# Patient Record
Sex: FEMALE | Race: BLACK OR AFRICAN AMERICAN | URBAN - METROPOLITAN AREA
[De-identification: names, ages, dates, MRNs, and addresses within clinical notes are randomized per-mention and may not be internally consistent; named-entity substitution may affect disease eponyms.]

---

## 2017-01-18 ENCOUNTER — APPOINTMENT (OUTPATIENT)
Dept: URBAN - METROPOLITAN AREA CLINIC 292 | Age: 22
Setting detail: DERMATOLOGY
End: 2017-01-18

## 2017-01-18 DIAGNOSIS — L90.5 SCAR CONDITIONS AND FIBROSIS OF SKIN: ICD-10-CM

## 2017-01-18 DIAGNOSIS — L81.0 POSTINFLAMMATORY HYPERPIGMENTATION: ICD-10-CM

## 2017-01-18 DIAGNOSIS — L70.0 ACNE VULGARIS: ICD-10-CM

## 2017-01-18 PROCEDURE — OTHER COUNSELING: OTHER

## 2017-01-18 PROCEDURE — OTHER CHEMICAL PEEL GLYCOLIC ACID: OTHER

## 2017-01-18 PROCEDURE — OTHER ACNE SURGERY: OTHER

## 2017-01-18 PROCEDURE — 99213 OFFICE O/P EST LOW 20 MIN: CPT | Mod: 25

## 2017-01-18 PROCEDURE — 10040 ACNE SURGERY: CPT

## 2017-01-18 ASSESSMENT — LOCATION ZONE DERM: LOCATION ZONE: FACE

## 2017-01-18 ASSESSMENT — LOCATION SIMPLE DESCRIPTION DERM: LOCATION SIMPLE: LEFT CHEEK

## 2017-01-18 ASSESSMENT — LOCATION DETAILED DESCRIPTION DERM: LOCATION DETAILED: LEFT LATERAL MALAR CHEEK

## 2017-01-18 NOTE — PROCEDURE: CHEMICAL PEEL GLYCOLIC ACID
Treatment Number: 0
Time (Mins): 3
Post Peel Care: The peel was neutralized with sodium bicarbonate.  After the procedure, the treatment area was washed with soap and water, and a post-peel cream was applied. Sun protection and post-care instructions were reviewed with the patient.
Glycolic Acid %: 30%
Price (Use Numbers Only, No Special Characters Or $): 00
Detail Level: Zone
Prep: The treated area was degreased with acetone and vaseline was applied for protection of mucous membranes.

## 2017-01-18 NOTE — PROCEDURE: ACNE SURGERY
Render Post-Care Instructions In Note?: no
Detail Level: Detailed
Post-Care Instructions: I reviewed with the patient in detail post-care instructions. Patient is to keep the treatment areaas dry overnight, and then apply bacitracin twice daily until healed. Patient may apply hydrogen peroxide soaks to remove any crusting.
Consent was obtained and risks were reviewed including but not limited to scarring, infection, bleeding, scabbing, incomplete removal, and allergy to anesthesia.
Prep Text (Optional): Prior to removal the treatment areas were prepped in the usual fashion.
Acne Type: Comedonal Lesions

## 2017-02-21 ENCOUNTER — APPOINTMENT (OUTPATIENT)
Dept: URBAN - METROPOLITAN AREA CLINIC 292 | Age: 22
Setting detail: DERMATOLOGY
End: 2017-02-21

## 2017-02-21 DIAGNOSIS — L70.0 ACNE VULGARIS: ICD-10-CM

## 2017-02-21 DIAGNOSIS — L90.5 SCAR CONDITIONS AND FIBROSIS OF SKIN: ICD-10-CM

## 2017-02-21 DIAGNOSIS — L81.0 POSTINFLAMMATORY HYPERPIGMENTATION: ICD-10-CM

## 2017-02-21 DIAGNOSIS — L30.8 OTHER SPECIFIED DERMATITIS: ICD-10-CM

## 2017-02-21 PROBLEM — L30.9 DERMATITIS, UNSPECIFIED: Status: ACTIVE | Noted: 2017-02-21

## 2017-02-21 PROCEDURE — 10040 ACNE SURGERY: CPT

## 2017-02-21 PROCEDURE — OTHER CHEMICAL PEEL GLYCOLIC ACID: OTHER

## 2017-02-21 PROCEDURE — OTHER COUNSELING: OTHER

## 2017-02-21 PROCEDURE — 99213 OFFICE O/P EST LOW 20 MIN: CPT | Mod: 25

## 2017-02-21 PROCEDURE — OTHER ACNE SURGERY: OTHER

## 2017-02-21 PROCEDURE — OTHER PRESCRIPTION: OTHER

## 2017-02-21 RX ORDER — BETAMETHASONE DIPROPIONATE 0.5 MG/G
OINTMENT TOPICAL
Qty: 1 | Refills: 3 | Status: ERX

## 2017-02-21 ASSESSMENT — LOCATION ZONE DERM: LOCATION ZONE: FACE

## 2017-02-21 ASSESSMENT — LOCATION DETAILED DESCRIPTION DERM: LOCATION DETAILED: LEFT LATERAL MALAR CHEEK

## 2017-02-21 ASSESSMENT — LOCATION SIMPLE DESCRIPTION DERM: LOCATION SIMPLE: LEFT CHEEK

## 2017-02-21 NOTE — PROCEDURE: CHEMICAL PEEL GLYCOLIC ACID
Prep: The treated area was degreased with acetone and vaseline was applied for protection of mucous membranes.
Detail Level: Zone
Time (Mins): 3
Price (Use Numbers Only, No Special Characters Or $): 00
Glycolic Acid %: 30%
Post Peel Care: The peel was neutralized with sodium bicarbonate.  After the procedure, the treatment area was washed with soap and water, and a post-peel cream was applied. Sun protection and post-care instructions were reviewed with the patient.
Treatment Number: 0

## 2017-02-21 NOTE — PROCEDURE: ACNE SURGERY
Acne Type: Comedonal Lesions
Post-Care Instructions: I reviewed with the patient in detail post-care instructions. Patient is to keep the treatment areaas dry overnight, and then apply bacitracin twice daily until healed. Patient may apply hydrogen peroxide soaks to remove any crusting.
Detail Level: Detailed
Render Number Of Lesions Treated: no
Consent was obtained and risks were reviewed including but not limited to scarring, infection, bleeding, scabbing, incomplete removal, and allergy to anesthesia.
Prep Text (Optional): Prior to removal the treatment areas were prepped in the usual fashion.

## 2017-03-21 ENCOUNTER — APPOINTMENT (OUTPATIENT)
Dept: URBAN - METROPOLITAN AREA CLINIC 292 | Age: 22
Setting detail: DERMATOLOGY
End: 2017-03-21

## 2017-03-21 DIAGNOSIS — L81.0 POSTINFLAMMATORY HYPERPIGMENTATION: ICD-10-CM

## 2017-03-21 DIAGNOSIS — L30.8 OTHER SPECIFIED DERMATITIS: ICD-10-CM

## 2017-03-21 DIAGNOSIS — L70.0 ACNE VULGARIS: ICD-10-CM

## 2017-03-21 DIAGNOSIS — L90.5 SCAR CONDITIONS AND FIBROSIS OF SKIN: ICD-10-CM

## 2017-03-21 PROBLEM — L30.9 DERMATITIS, UNSPECIFIED: Status: ACTIVE | Noted: 2017-03-21

## 2017-03-21 PROCEDURE — OTHER COUNSELING: OTHER

## 2017-03-21 PROCEDURE — OTHER CHEMICAL PEEL GLYCOLIC ACID: OTHER

## 2017-03-21 PROCEDURE — 99213 OFFICE O/P EST LOW 20 MIN: CPT | Mod: 25

## 2017-03-21 PROCEDURE — OTHER ACNE SURGERY: OTHER

## 2017-03-21 PROCEDURE — 10040 ACNE SURGERY: CPT

## 2017-03-21 PROCEDURE — OTHER PRESCRIPTION: OTHER

## 2017-03-21 RX ORDER — BETAMETHASONE DIPROPIONATE 0.5 MG/G
OINTMENT TOPICAL
Qty: 1 | Refills: 3 | Status: ERX

## 2017-03-21 RX ORDER — TRETINOIN 1 MG/G
CREAM TOPICAL
Qty: 1 | Refills: 6 | Status: ERX

## 2017-03-21 ASSESSMENT — LOCATION DETAILED DESCRIPTION DERM: LOCATION DETAILED: LEFT LATERAL MALAR CHEEK

## 2017-03-21 ASSESSMENT — LOCATION ZONE DERM: LOCATION ZONE: FACE

## 2017-03-21 ASSESSMENT — LOCATION SIMPLE DESCRIPTION DERM: LOCATION SIMPLE: LEFT CHEEK

## 2017-03-21 NOTE — PROCEDURE: CHEMICAL PEEL GLYCOLIC ACID
Prep: The treated area was degreased with acetone and vaseline was applied for protection of mucous membranes.
Price (Use Numbers Only, No Special Characters Or $): 00
Treatment Number: 0
Glycolic Acid %: 30%
Post Peel Care: The peel was neutralized with sodium bicarbonate.  After the procedure, the treatment area was washed with soap and water, and a post-peel cream was applied. Sun protection and post-care instructions were reviewed with the patient.
Time (Mins): 3
Detail Level: Zone

## 2017-03-21 NOTE — PROCEDURE: ACNE SURGERY
Render Number Of Lesions Treated: no
Post-Care Instructions: I reviewed with the patient in detail post-care instructions. Patient is to keep the treatment areaas dry overnight, and then apply bacitracin twice daily until healed. Patient may apply hydrogen peroxide soaks to remove any crusting.
Detail Level: Detailed
Consent was obtained and risks were reviewed including but not limited to scarring, infection, bleeding, scabbing, incomplete removal, and allergy to anesthesia.
Prep Text (Optional): Prior to removal the treatment areas were prepped in the usual fashion.
Acne Type: Comedonal Lesions

## 2017-04-27 ENCOUNTER — APPOINTMENT (OUTPATIENT)
Dept: URBAN - METROPOLITAN AREA CLINIC 292 | Age: 22
Setting detail: DERMATOLOGY
End: 2017-04-27

## 2017-04-27 DIAGNOSIS — L30.8 OTHER SPECIFIED DERMATITIS: ICD-10-CM

## 2017-04-27 DIAGNOSIS — L70.0 ACNE VULGARIS: ICD-10-CM

## 2017-04-27 DIAGNOSIS — L81.0 POSTINFLAMMATORY HYPERPIGMENTATION: ICD-10-CM

## 2017-04-27 DIAGNOSIS — L90.5 SCAR CONDITIONS AND FIBROSIS OF SKIN: ICD-10-CM

## 2017-04-27 PROBLEM — L30.9 DERMATITIS, UNSPECIFIED: Status: ACTIVE | Noted: 2017-04-27

## 2017-04-27 PROCEDURE — OTHER PRESCRIPTION: OTHER

## 2017-04-27 PROCEDURE — OTHER COUNSELING: OTHER

## 2017-04-27 PROCEDURE — OTHER ACNE SURGERY: OTHER

## 2017-04-27 PROCEDURE — OTHER CHEMICAL PEEL GLYCOLIC ACID: OTHER

## 2017-04-27 PROCEDURE — 99213 OFFICE O/P EST LOW 20 MIN: CPT | Mod: 25

## 2017-04-27 PROCEDURE — 10040 ACNE SURGERY: CPT

## 2017-04-27 RX ORDER — BETAMETHASONE DIPROPIONATE 0.5 MG/G
OINTMENT TOPICAL
Qty: 1 | Refills: 2 | Status: ERX

## 2017-04-27 RX ORDER — AMMONIUM LACTATE 120 MG/G
LOTION TOPICAL
Qty: 1 | Refills: 3 | Status: ERX | COMMUNITY
Start: 2017-04-27

## 2017-04-27 ASSESSMENT — LOCATION DETAILED DESCRIPTION DERM
LOCATION DETAILED: LEFT LATERAL MALAR CHEEK
LOCATION DETAILED: LEFT THENAR EMINENCE

## 2017-04-27 ASSESSMENT — LOCATION SIMPLE DESCRIPTION DERM
LOCATION SIMPLE: LEFT CHEEK
LOCATION SIMPLE: LEFT HAND

## 2017-04-27 ASSESSMENT — LOCATION ZONE DERM
LOCATION ZONE: HAND
LOCATION ZONE: FACE

## 2017-04-27 NOTE — PROCEDURE: CHEMICAL PEEL GLYCOLIC ACID
Treatment Number: 0
Post Peel Care: The peel was neutralized with sodium bicarbonate.  After the procedure, the treatment area was washed with soap and water, and a post-peel cream was applied. Sun protection and post-care instructions were reviewed with the patient.
Glycolic Acid %: 30%
Time (Mins): 3
Price (Use Numbers Only, No Special Characters Or $): 00
Prep: The treated area was degreased with acetone and vaseline was applied for protection of mucous membranes.
Detail Level: Zone

## 2017-04-27 NOTE — PROCEDURE: ACNE SURGERY
Prep Text (Optional): Prior to removal the treatment areas were prepped in the usual fashion.
Consent was obtained and risks were reviewed including but not limited to scarring, infection, bleeding, scabbing, incomplete removal, and allergy to anesthesia.
Post-Care Instructions: I reviewed with the patient in detail post-care instructions. Patient is to keep the treatment areaas dry overnight, and then apply bacitracin twice daily until healed. Patient may apply hydrogen peroxide soaks to remove any crusting.
Acne Type: Comedonal Lesions
Render Post-Care Instructions In Note?: no
Detail Level: Detailed

## 2018-10-08 LAB — HIV AG/AB: NON REACTIVE

## 2018-10-23 LAB — PAP SMEAR: NORMAL

## 2019-04-04 ENCOUNTER — OFFICE VISIT (OUTPATIENT)
Dept: FAMILY MEDICINE CLINIC | Age: 24
End: 2019-04-04
Payer: MEDICAID

## 2019-04-04 VITALS
OXYGEN SATURATION: 99 % | WEIGHT: 185 LBS | HEIGHT: 65 IN | HEART RATE: 98 BPM | TEMPERATURE: 98.9 F | RESPIRATION RATE: 12 BRPM | BODY MASS INDEX: 30.82 KG/M2

## 2019-04-04 DIAGNOSIS — Z3A.38 38 WEEKS GESTATION OF PREGNANCY: ICD-10-CM

## 2019-04-04 DIAGNOSIS — Z00.00 ROUTINE HEALTH MAINTENANCE: Primary | ICD-10-CM

## 2019-04-04 DIAGNOSIS — F41.9 ANXIETY AND DEPRESSION: ICD-10-CM

## 2019-04-04 DIAGNOSIS — F32.A ANXIETY AND DEPRESSION: ICD-10-CM

## 2019-04-04 DIAGNOSIS — Z87.898 HISTORY OF DOMESTIC VIOLENCE: ICD-10-CM

## 2019-04-04 PROCEDURE — 99385 PREV VISIT NEW AGE 18-39: CPT | Performed by: NURSE PRACTITIONER

## 2019-04-04 PROCEDURE — G0444 DEPRESSION SCREEN ANNUAL: HCPCS | Performed by: NURSE PRACTITIONER

## 2019-04-04 SDOH — HEALTH STABILITY: MENTAL HEALTH: HOW OFTEN DO YOU HAVE A DRINK CONTAINING ALCOHOL?: NEVER

## 2019-04-04 ASSESSMENT — PATIENT HEALTH QUESTIONNAIRE - PHQ9
SUM OF ALL RESPONSES TO PHQ QUESTIONS 1-9: 19
4. FEELING TIRED OR HAVING LITTLE ENERGY: 2
2. FEELING DOWN, DEPRESSED OR HOPELESS: 3
7. TROUBLE CONCENTRATING ON THINGS, SUCH AS READING THE NEWSPAPER OR WATCHING TELEVISION: 3
5. POOR APPETITE OR OVEREATING: 0
8. MOVING OR SPEAKING SO SLOWLY THAT OTHER PEOPLE COULD HAVE NOTICED. OR THE OPPOSITE, BEING SO FIGETY OR RESTLESS THAT YOU HAVE BEEN MOVING AROUND A LOT MORE THAN USUAL: 2
SUM OF ALL RESPONSES TO PHQ9 QUESTIONS 1 & 2: 6
SUM OF ALL RESPONSES TO PHQ QUESTIONS 1-9: 19
1. LITTLE INTEREST OR PLEASURE IN DOING THINGS: 3
3. TROUBLE FALLING OR STAYING ASLEEP: 3
10. IF YOU CHECKED OFF ANY PROBLEMS, HOW DIFFICULT HAVE THESE PROBLEMS MADE IT FOR YOU TO DO YOUR WORK, TAKE CARE OF THINGS AT HOME, OR GET ALONG WITH OTHER PEOPLE: 3
9. THOUGHTS THAT YOU WOULD BE BETTER OFF DEAD, OR OF HURTING YOURSELF: 0
6. FEELING BAD ABOUT YOURSELF - OR THAT YOU ARE A FAILURE OR HAVE LET YOURSELF OR YOUR FAMILY DOWN: 3

## 2019-04-04 ASSESSMENT — ENCOUNTER SYMPTOMS
GASTROINTESTINAL NEGATIVE: 1
RESPIRATORY NEGATIVE: 1
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
## 2019-04-08 ENCOUNTER — OFFICE VISIT (OUTPATIENT)
Dept: BEHAVIORAL/MENTAL HEALTH | Age: 24
End: 2019-04-08
Payer: MEDICAID

## 2019-04-08 DIAGNOSIS — F43.10 PTSD (POST-TRAUMATIC STRESS DISORDER): Primary | ICD-10-CM

## 2019-04-08 DIAGNOSIS — F32.0 CURRENT MILD EPISODE OF MAJOR DEPRESSIVE DISORDER, UNSPECIFIED WHETHER RECURRENT (HCC): ICD-10-CM

## 2019-04-08 PROCEDURE — 90791 PSYCH DIAGNOSTIC EVALUATION: CPT | Performed by: COUNSELOR
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
## 2019-04-08 NOTE — PATIENT INSTRUCTIONS
1) Review the attached information on sleep hygiene. Go through the worksheet at the end of it; are there things that make sense to try right now? If so, do them. 2) Make sure to eat regularly. If you haven't eaten in 3-4 hours, grab at least a small snack, even if you have no appetite. If your stomach is talking to you, listen. 3) Aim for at least 30-40 cumulative minutes of movement each day. This doesn't have to be all at once, or a workout. Just stretch or dance to a song you like. If you've been sitting for more than an hour or two, get up and move around for a few minutes. 4) Review the attached list of coping skills. Try 1-2 a week; do more if you're feeling frisky! 5) Review the attached information on deep breathing. Use this to help manage intense emotions, or just to relax. Practice this daily, even if you're not feeling particularly stressed. 6) Use the attached list of grounding techniques to interrupt uncomfortable memories when they arise. Follow up with something relaxing to extend the feeling of relief. 7) Are there things that you've stopped doing because of stress or your mood? If so, make note of them and consider some ways to reincorporate them into your life. This is also something that can be discussed in counseling. 8) Remember to be kind to yourself. This is a challenging experience, and you're doing the best you can. 9) Resources on intimate partner violence:  · [place] alex (web-based, or via [address] [place])  · http://stopabuse. [facility]/index.html  · Www.loveisrespect. org  · Local hotline for the [facility]: 6200 413 73 47      Sleep Hygiene Guidelines    Good dental hygiene is important in determining the health of your teeth and gums. We all know we are supposed to brush and floss regularly. Those who do so are more likely to have strong, healthy gums and less cavities. Similarly, good sleep hygiene is important in determining the quality and quantity of your sleep. Below are guidelines for good sleep hygiene practices. Review these guidelines and evaluate how well you practice good sleep hygiene. Caffeine:  Avoid Caffeine 6-8 Hours Before Bedtime       Caffeine disturbs sleep, even in people who do not think they experience a stimulation effect. Individuals with insomnia are often more sensitive to mild stimulants than are normal sleepers. Caffeine is found in items such as coffee, tea, soda, chocolate, and many over-the-counter medications (e.g., Excedrin). Thus, drinking caffeinated beverages should be avoided near bedtime and during the night. You might consider a trial period of no caffeine if you tend to be sensitive to its effects. Nicotine:  Avoid Nicotine Before Bedtime       Although some smokers claim that smoking helps them relax, but nicotine is a stimulant. The initial relaxing effects occur with the initial entry of the nicotine, but as the nicotine builds in the system it produces an effect similar to caffeine. Thus, smoking, dipping, or chewing tobacco should be avoided near bedtime and during the night. Dont smoke to get yourself back to sleep. Alcohol:  Avoid Alcohol After Dinner       Alcohol often promotes the onset of sleep, but as alcohol is metabolized sleep becomes disturbed and fragmented. Thus, a large amount of alcohol is a poor sleep aid and should not be used as such. Limit alcohol use to small quantities to moderate quantities. Sleeping Pills:  Sleep Medications are Effective Only Temporarily       Scientists have shown that sleep medications lose their effectiveness in about 2 - 4 weeks when taken regularly. Despite advertisements to the contrary, over-the-counter sleeping aids have little impact on sleep beyond the placebo effect. Over time, sleeping pills actually can make sleep problems worse. When sleeping pills have been used for a long period, withdrawal from the medication can lead to an insomnia rebound.   Thus, to elevate you head with some pillows. Avoid Naps       Avoid naps, the sleep you obtain during the day takes away from you sleep need that night resulting in lighter, more restless sleep, difficulty falling asleep or early morning awakening. If you must nap, keep it brief, and take the nap about 8 hours after arising. It is best to set an alarm to ensure you dont sleep more than 10-15 minutes. Limit Your Time in Bed        Restrict your sleep period to the average number of hours you have actually slept per night during the preceding week. Quality of sleep is important. Too much time in bed can decrease the quality on subsequent night and contribute to the maintenance of existing sleep problems. Dont lay in bed for extended times not sleep. If you arent asleep in about 15-20 minutes go ahead and get up. Do something outside the bedroom that is relaxing. When you feel sleepy (i.e., yawning, head bobbing, eyes closing, concentration decreasing, then return to bed. Dont confuse tiredness with sleepiness, they are different. Tiredness doesnt lead to sleep, only sleepiness does. Regular Sleep Schedule       Keep a regular time each day, 7 days a week, to get out of bed. Keeping a regular awaking time helps set your circadian rhythm set so that your body learns to sleep at the desired time. Use the attached form to develop a plan for improving you sleep hygiene. It will take time for you sleep to get back in line so once you begin your sleep hygiene plan, stick with if for at least 6-8 weeks. Planned Improvements of My Sleep Hygiene    Check Those  That Apply  _____ Avoid Caffeine 6-8 Hours Before Bedtime. I will not have caffeine after ________ PM.    ____ Avoid Nicotine Before Bedtime. I will not have a cigarette after _________ PM.    ______  Limit Alcohol Use. I will not have more than _______ drinks in the evening.    ______ Avoid Use of Sleeping Pills.  (If you are currently using them regularly, all changes should be   medical supervised by your medical provider). ______ Do Exercise Regularly, But Not Within 2 Hours of Bedtime. I ________________ for ____   minutes, on the following days ____________________________________________________    ______ Ensure your Bedroom is a Comfortable Temperature, Quiet, and Dark and Your   Mattress and Pillow are good. I will make the  following changes to my bedroom   ____________________________________________________________________________    ______ Do Take a Hot Bath 1-2 Hours Prior to Bedtime. I will take a hot bath about ______ PM.    ______ Eat a Light Snack at Bedtime but Avoid Large or Problematic Foods. I will eat     __________________  or _____________________ or __________________ before bed.    ______ Avoid Naps. I try not to nap, if I must, I will limit it to _______ minutes, about 8 hours after I   awoke and will use alarm to limit my nap time. ______ Limit Time In Bed. I have been sleeping on average ______ hours per night, therefore I will   limit my time in bed to _____ hours (the same number). If Im not asleep in about 15 to 20   minutes I will get up and not return to bed until Im sleepy.    ______ Stay on a Regular Sleep Schedule  I will get up at _______ AM, 7 days a week, no matter   how poorly I slept that night. Relaxation:  Diaphragmatic Breathing             _____________________________________________________________________________    1. Sit in a comfortable position    2. Place one hand on your stomach and the other on your chest    3. Try to breathe so that only your stomach rises and falls    As you inhale, concentrate on your chest remaining relatively still while your stomach rises. It may be helpful for you to imagine that your pants are too big and you need to push your stomach out to hold them up. When exhaling, allow your stomach to fall in and the air to fully escape.     Inhale slowly. You may choose to hold the air in for about a second. Exhale slowly. Dont push the air out, but just let the natural pressure of your body slowly move it out. It is normal for this healthy method of breathing to feel a little awkward at first.  With practice, it will feel more natural.    4. Get your mind on your side    One other important factor in getting relaxed is your mind. Your mind and body are connected. The mind influences the body and the body influences the mind. What you do with your mind when you are trying to relax is very important. The key is to avoid thinking about stressful things. You can think about      Neutral things (e.g., counting, saying a word like calm or relax)   Pleasant things (e.g., imagining a pleasant place)    5. It is recommended that you practice 2 times per day, 10 minutes each time. DETACHING FROM EMOTIONAL PAIN (GROUNDING)   Marina Larson, PhD     Kymberly Cassidy? Grounding is a set of simple strategies to detach from emotional pain (for example, drug cravings, self-harm impulses, anger, sadness). Distraction works by focusing outward on the external world--rather than inward toward the self. You can also think of it as distraction, centering, a safe place, looking outward, or healthy detachment.      WHY DO GROUNDING? When you are overwhelmed with emotional pain, you need a way to detach so that you can gain control over your feelings and stay safe. As long as you are grounding, you cannot possibly use substances or hurt yourself. Grounding anchors you to the present and to reality. Many people with PTSD and substance abuse struggle with either feeling too much (overwhelming emotions and memories) or too little (numbing and dissociation). In grounding, you attain balance between the two -- conscious of reality and able to tolerate it.      GUIDELINES     · Grounding can be done any time, any place, anywhere and no one has to know. · Use grounding when you are: faced with a trigger, having a flashback, dissociating, having a substance craving, or when your emotional pain goes above 6 (on a 0-10 scale). Grounding puts healthy distance between you and these negative feelings. · Keep your eyes open, scan the room, and turn the light on to stay in touch with the present. · Rate your mood before and after to test whether it worked. Before grounding, rate your level of emotional pain (0-10, where 10 means extreme pain). Then re-rate it afterwards. Has it gone down? · No talking about negative feelings or journal writing. You want to distract away from negative feelings, not get in touch with them. · Stay neutral--no judgments of good and bad. For example, The walls are blue; I dislike blue because it reminds me of depression.  Simply say The wafts are blue and move on. · Focus on the present, not the past or future. · Note that grounding is not the same as relaxation training. Grounding is much more active, focuses on distraction strategies, and is intended to help extreme negative feelings. It to believed to be more effective for PTSD than relaxation training. WAYS TO GROUND     Mental Grounding     ? Describe your environment in detail using all your senses. For example, The walls are white; there are five pink chairs, there is a wooden bookshelf against the kelby. .. Describe objects, sounds, textures, colors, smells, shapes, numbers and temperature. You can do this anywhere. For example, on the subway: Im on the subway. Ill see the river soon. Those are the windows. This is the bench. The metal bar is silver. The subway map has four colors. ..   ? Play a [employer] game with yourself. Try to think of types of dogs, [employer] musicians, states that begin with A, cars, TV shows, writers, sports, [place], [place] cities.    ? Do an age progression.  If you have regressed to a younger Stretch. Extend your fingers, arms or legs as far as you can; roll your head around. ?  Walk slowly, noticing each footstep, saying left, right with each step. ?  Eat something. Describe the flavors in detail to yourself. ?  Focus on your breathing. Noticing each inhale and exhale. Repeat a pleasant word to yourself on each inhale (for example, a favorite, color or a soothing word such as safe or easy). Soothing Grounding     ? Say kind statements, as if you were talking to a small child. E.g.. You are a good person going through a hard time. [place] Essex get through this. ? Think of favorites. Think of your favorite color, animal, season, food, time of day, TV show. ? Picture people you care about (e.g., your children; and look at photographs of them). ? Remember the words to an inspiring song, quotation or poem that makes you feel better (e.g.. the Serenity Prayer). ? Remember a safe place. Describe a place that you find very soothing (perhaps the beach or mountains, or a favorite room); focus on everything about that place--the sounds, colors, shapes, objects, textures. ? Say a coping statement. I can handle this, This feeling will pass.    ? Plan out a safe treat for yourself, such as a piece of candy, a nice dinner, or a warm bath. ? Think of things you are looking forward to in the next week. Perhaps time with a friend or going to a movie. WHAT IF GROUNDING DOESNT WORK?     ? Practice as often as possible. Even when you dont need it, so that youll know it by heart. ? Practice faster. Speeding up the pace gets you focused on the outside world quickly. ? Try grounding for a Iooooooonnnng time (20-30 minutes). And, repeat, repeat, repeat. ? Try to notice whether you do better with physical or mental grounding. ? Create your own methods of grounding. Any method you make up may be worth much more than those you read here because it is yours.    ? Start grounding early in a negative mood cycle. 60+ Essential Positive Coping Skills    There are nearly infinite ways to cope. We all use different methods that suit our unique personalities and needs. You may find that what causes stress in one individual will help another to cope. Its not important whether you cope like everyone else; all that matters is that you find effective coping methods that will help you build resilience and thrive! That being said, dont beat yourself up if you just need a little distraction sometimes. Below, youll find Yassine Clinton (2016) master list of coping methods and skills organized into categories. Whatever you need in the moment, there is probably at least one activity mentioned below that will help!     Diversions:  Write, draw, paint, photography   Play an instrument, sing, dance, act   Take a shower or a bath   Garden   Take a walk, or go for a drive   Watch television or a movie   Watch cute kitten videos on [employer] a game   Go shopping   Clean or organize your environment   Read   Take a break or vacation    Social/Interpersonal Coping:  Talk to someone you trust   Set boundaries and say no   Write a note to someone you care about   Be assertive   Use humor  Spend time with friends and/or family   Serve someone in need   Care for or play with a pet   Role-play challenging situations with others   Encourage others    Cognitive Coping:  Make a gratitude list  Brainstorm solutions   Lower your expectations of the situation   Keep an inspirational quote with you   Be flexible   Write a list of goals   Take a class Act opposite of negative feelings   Write a list of pros and cons for decisions   Reward or pamper yourself when successful   Write a list of strengths   Accept a challenge with a positive attitude    Tension Releasers:  Exercise or play sports   Catharsis (yelling in the bathroom, punching a punching bag)   Cry   Laugh    Physical:  Get enough sleep   Eat healthy tattoos. · Write (poetry, stories, journal). · Scribble/doodle on paper. · Be with other people. · Watch a favorite TV show. · Post on [employer], and answer others' posts. · Go see a movie. · Do a word search or crossword puzzle. · Do schoolwork or read about something new and interesting. · Play a musical instrument. · Paint your nails, do make-up, or style your hair. · Sing. · Study the carmelina. · Punch a punching bag. · Cover yourself with Band-Aids instead of cutting. · Let yourself cry. · Take a nap (only if you are tired! ). · Take a hot shower or relaxing bath. · Play with a pet. · Go shopping. · Clean something. · Knit or sew. · Read a good book. · Listen to music. · Try some aromatherapy (candle, lotion, room spray). · Meditate. · Go somewhere very public. · Bake cookies. · Alphabetize your TV Compass / Landon Call / books. · Paint or draw. · Rip paper into itty-bitty pieces. · Shoot hoops, kick a ball. · Write a letter or send an email. · Plan your dream room (colors / furniture). · Hug a pillow or stuffed animal.  · Hyperfocus on something like a rock, hand, etc. (This is sensory mindfulness!)  · Dance. · Make hot chocolate, a milkshake, or a smoothie. · Play with modeling clarence or Play-Kim. · Build a pillow fort. · Go for a nice, long drive. · Complete something you've been putting off. · Draw on yourself with a marker. · Take up a new hobby. · Look up recipes, cook a meal.  · Look at pretty things, like flowers or art. · Create or build something. · [place]iner. · Make a list of blessings in your life. · Read the Bible. · Go to a friend's house. · Jump on a trampoline. · Watch an old, happy movie. · Contact a hotline / your therapist. Jose Alvarez can also dial the [facility] crisis line at 1-800-HOT-HELP (577-9643). · Talk to someone close to you. · Ride a bicycle. · Feed the ducks, birds, or squirrels. · Color. · Memorize a poem, play, or song. · Stretch.   · Search for ridiculous things on the internet. · \"Shop\" online (without buying anything -- save that for when you feel better). · Color-coordinate your wardrobe. · Watch fish. · Make a CD or playlist of your favorite songs. · Play the \"15 minute game. \" (When you're frustrated by something - do something else for 15 minutes, then try again when time is up.)  · Plan your wedding / Sharon Nikia / birthday party / another event. · Plant some seeds. · Hunt for your perfect home or car online. · Try to make as many words out of your full name as possible. · Sort through or edit your pictures. · Play with a balloon. · Give yourself a facial.  · Play with a favorite childhood toy. · Start collecting something. · Play video / computer games. · Clean up trash at your local park. · Try the Anxiety and Depression Association of [place]'s online support group. You can find more information at https://adaa.org/adaa-online-support-group  · Text or call an old friend. · Write yourself an \"I love you because. Youlanda Saris Youlanda Saris \" letter. · Look up new words and use them. · Rearrange furniture. · Write a letter to someone that you may never send. · Smile at five people. · Play with a younger family member. · Go for a walk (with or without a friend). · Put a puzzle together. · Clean your room / closet. · Try to do handstands, cartwheels, or backbends. · Yoga. · Teach your pet a new trick. · Learn a new language. · Move EVERYTHING in your room to a new spot. · Get together with friends and play Frisbee or a pick-up sport. · [place] a friend or family member. · Search online for new songs / artists. · Make a list of goals for the week / month / year / next 5 years. · Perform a random act of kindness.

## 2019-04-15 ENCOUNTER — OFFICE VISIT (OUTPATIENT)
Dept: BEHAVIORAL/MENTAL HEALTH | Age: 24
End: 2019-04-15
Payer: MEDICAID

## 2019-04-15 DIAGNOSIS — F32.0 CURRENT MILD EPISODE OF MAJOR DEPRESSIVE DISORDER, UNSPECIFIED WHETHER RECURRENT (HCC): ICD-10-CM

## 2019-04-15 DIAGNOSIS — F43.10 PTSD (POST-TRAUMATIC STRESS DISORDER): Primary | ICD-10-CM

## 2019-04-15 PROCEDURE — 90837 PSYTX W PT 60 MINUTES: CPT | Performed by: COUNSELOR
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
## 2019-05-02 ENCOUNTER — OFFICE VISIT (OUTPATIENT)
Dept: BEHAVIORAL/MENTAL HEALTH | Age: 24
End: 2019-05-02
Payer: MEDICAID

## 2019-05-02 DIAGNOSIS — F32.0 CURRENT MILD EPISODE OF MAJOR DEPRESSIVE DISORDER, UNSPECIFIED WHETHER RECURRENT (HCC): ICD-10-CM

## 2019-05-02 DIAGNOSIS — F43.10 PTSD (POST-TRAUMATIC STRESS DISORDER): Primary | ICD-10-CM

## 2019-05-02 PROCEDURE — 90837 PSYTX W PT 60 MINUTES: CPT | Performed by: COUNSELOR
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
## 2019-05-08 ENCOUNTER — OFFICE VISIT (OUTPATIENT)
Dept: BEHAVIORAL/MENTAL HEALTH | Age: 24
End: 2019-05-08
Payer: COMMERCIAL

## 2019-05-08 DIAGNOSIS — F43.10 PTSD (POST-TRAUMATIC STRESS DISORDER): Primary | ICD-10-CM

## 2019-05-08 DIAGNOSIS — F32.0 CURRENT MILD EPISODE OF MAJOR DEPRESSIVE DISORDER, UNSPECIFIED WHETHER RECURRENT (HCC): ICD-10-CM

## 2019-05-08 PROCEDURE — 90834 PSYTX W PT 45 MINUTES: CPT | Performed by: COUNSELOR
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
## 2019-08-15 ENCOUNTER — OFFICE VISIT (OUTPATIENT)
Dept: BEHAVIORAL/MENTAL HEALTH | Age: 24
End: 2019-08-15
Payer: COMMERCIAL

## 2019-08-15 DIAGNOSIS — F43.10 PTSD (POST-TRAUMATIC STRESS DISORDER): Primary | ICD-10-CM

## 2019-08-15 DIAGNOSIS — F32.0 CURRENT MILD EPISODE OF MAJOR DEPRESSIVE DISORDER, UNSPECIFIED WHETHER RECURRENT (HCC): ICD-10-CM

## 2019-08-15 PROCEDURE — 90837 PSYTX W PT 60 MINUTES: CPT | Performed by: COUNSELOR

## 2019-08-15 NOTE — PATIENT INSTRUCTIONS
have more than _______ drinks in the evening.    ______ Avoid Use of Sleeping Pills. (If you are currently using them regularly, all changes should be   medical supervised by your medical provider). ______ Do Exercise Regularly, But Not Within 2 Hours of Bedtime. I ________________ for ____   minutes, on the following days ____________________________________________________    ______ Ensure your Bedroom is a Comfortable Temperature, Quiet, and Dark and Your   Mattress and Pillow are good. I will make the  following changes to my bedroom   ____________________________________________________________________________    ______ Do Take a Hot Bath 1-2 Hours Prior to Bedtime. I will take a hot bath about ______ PM.    ______ Eat a Light Snack at Bedtime but Avoid Large or Problematic Foods. I will eat     __________________  or _____________________ or __________________ before bed.    ______ Avoid Naps. I try not to nap, if I must, I will limit it to _______ minutes, about 8 hours after I   awoke and will use alarm to limit my nap time. ______ Limit Time In Bed. I have been sleeping on average ______ hours per night, therefore I will   limit my time in bed to _____ hours (the same number). If Im not asleep in about 15 to 20   minutes I will get up and not return to bed until Im sleepy.    ______ Stay on a Regular Sleep Schedule  I will get up at _______ AM, 7 days a week, no matter   how poorly I slept that night. Relaxation:  Diaphragmatic Breathing             _____________________________________________________________________________    1. Sit in a comfortable position    2. Place one hand on your stomach and the other on your chest    3. Try to breathe so that only your stomach rises and falls    As you inhale, concentrate on your chest remaining relatively still while your stomach rises.   It may be helpful for you to imagine that your pants are too big and you need to push your stomach out to able to tolerate it. GUIDELINES     · Grounding can be done any time, any place, anywhere and no one has to know. · Use grounding when you are: faced with a trigger, having a flashback, dissociating, having a substance craving, or when your emotional pain goes above 6 (on a 0-10 scale). Grounding puts healthy distance between you and these negative feelings. · Keep your eyes open, scan the room, and turn the light on to stay in touch with the present. · Rate your mood before and after to test whether it worked. Before grounding, rate your level of emotional pain (0-10, where 10 means extreme pain). Then re-rate it afterwards. Has it gone down? · No talking about negative feelings or journal writing. You want to distract away from negative feelings, not get in touch with them. · Stay neutral--no judgments of good and bad. For example, The walls are blue; I dislike blue because it reminds me of depression.  Simply say The wafts are blue and move on. · Focus on the present, not the past or future. · Note that grounding is not the same as relaxation training. Grounding is much more active, focuses on distraction strategies, and is intended to help extreme negative feelings. It to believed to be more effective for PTSD than relaxation training. WAYS TO GROUND     Mental Grounding     ? Describe your environment in detail using all your senses. For example, The walls are white; there are five pink chairs, there is a wooden bookshelf against the kelby. .. Describe objects, sounds, textures, colors, smells, shapes, numbers and temperature. You can do this anywhere. For example, on the subway: Im on the subway. Ill see the river soon. Those are the windows. This is the bench. The metal bar is silver. The subway map has four colors. ..   ? Play a "[employer] game with yourself.  Try to think of types of dogs, Kim Garett musicians, states that begin with A, cars, TV shows, writers,

## 2019-09-03 ENCOUNTER — OFFICE VISIT (OUTPATIENT)
Dept: BEHAVIORAL/MENTAL HEALTH | Age: 24
End: 2019-09-03
Payer: COMMERCIAL

## 2019-09-03 DIAGNOSIS — F32.0 CURRENT MILD EPISODE OF MAJOR DEPRESSIVE DISORDER, UNSPECIFIED WHETHER RECURRENT (HCC): ICD-10-CM

## 2019-09-03 DIAGNOSIS — F43.10 PTSD (POST-TRAUMATIC STRESS DISORDER): Primary | ICD-10-CM

## 2019-09-03 PROCEDURE — 90837 PSYTX W PT 60 MINUTES: CPT | Performed by: COUNSELOR

## 2019-09-03 NOTE — PROGRESS NOTES
partner violence:  · [employer] alex (web-based, or via aka-aki networks / [employer])  · http://stopabuse. [facility]/index.html  · Www.loveisrespect. org  · Local hotline for the [facility]: (967) G9964715  10) Check out https://outMobile Event Guide. [employer]/ for more information on dealing with self-focused individuals. Patient to return in approximately 1 week for follow-up. All questions about treatment plan answered. Pt instructed to call the crisis line and/or proceed to emergency room if suicidal or homicidal ideations occur outside of clinic hours and crisis management skills do not provide relief. Pt stated understanding and is agreeable to treatment and crisis plan.     (Please note that portions of this note were completed with a voice recognition program. Efforts were made to edit the dictations but occasionally words are mis-transcribed.)      Provider Signature:  Electronically signed by Ygnacio Apley, PSYD on 9/3/2019 at 11:21 AM
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
## 2019-11-04 ENCOUNTER — TELEPHONE (OUTPATIENT)
Dept: BEHAVIORAL/MENTAL HEALTH | Age: 24
End: 2019-11-04

## 2022-12-08 ENCOUNTER — OFFICE VISIT (OUTPATIENT)
Dept: PRIMARY CARE CLINIC | Age: 27
End: 2022-12-08

## 2022-12-08 VITALS
WEIGHT: 190 LBS | DIASTOLIC BLOOD PRESSURE: 70 MMHG | TEMPERATURE: 98.3 F | BODY MASS INDEX: 33.66 KG/M2 | HEART RATE: 87 BPM | OXYGEN SATURATION: 100 % | HEIGHT: 63 IN | SYSTOLIC BLOOD PRESSURE: 120 MMHG

## 2022-12-08 DIAGNOSIS — J06.9 VIRAL UPPER RESPIRATORY TRACT INFECTION: Primary | ICD-10-CM

## 2022-12-08 DIAGNOSIS — F32.A DEPRESSION, UNSPECIFIED DEPRESSION TYPE: ICD-10-CM

## 2022-12-08 PROCEDURE — 99213 OFFICE O/P EST LOW 20 MIN: CPT

## 2022-12-08 RX ORDER — GUAIFENESIN AND CODEINE PHOSPHATE 100; 10 MG/5ML; MG/5ML
5 SOLUTION ORAL
Qty: 25 ML | Refills: 0 | Status: SHIPPED | OUTPATIENT
Start: 2022-12-08 | End: 2022-12-13

## 2022-12-08 RX ORDER — ESCITALOPRAM OXALATE 10 MG/1
10 TABLET ORAL DAILY
Qty: 30 TABLET | Refills: 0 | Status: SHIPPED | OUTPATIENT
Start: 2022-12-08

## 2022-12-08 ASSESSMENT — PATIENT HEALTH QUESTIONNAIRE - PHQ9
SUM OF ALL RESPONSES TO PHQ QUESTIONS 1-9: 12
10. IF YOU CHECKED OFF ANY PROBLEMS, HOW DIFFICULT HAVE THESE PROBLEMS MADE IT FOR YOU TO DO YOUR WORK, TAKE CARE OF THINGS AT HOME, OR GET ALONG WITH OTHER PEOPLE: 2
8. MOVING OR SPEAKING SO SLOWLY THAT OTHER PEOPLE COULD HAVE NOTICED. OR THE OPPOSITE, BEING SO FIGETY OR RESTLESS THAT YOU HAVE BEEN MOVING AROUND A LOT MORE THAN USUAL: 0
2. FEELING DOWN, DEPRESSED OR HOPELESS: 1
4. FEELING TIRED OR HAVING LITTLE ENERGY: 1
3. TROUBLE FALLING OR STAYING ASLEEP: 3
7. TROUBLE CONCENTRATING ON THINGS, SUCH AS READING THE NEWSPAPER OR WATCHING TELEVISION: 2
SUM OF ALL RESPONSES TO PHQ QUESTIONS 1-9: 12
SUM OF ALL RESPONSES TO PHQ QUESTIONS 1-9: 12
9. THOUGHTS THAT YOU WOULD BE BETTER OFF DEAD, OR OF HURTING YOURSELF: 0
6. FEELING BAD ABOUT YOURSELF - OR THAT YOU ARE A FAILURE OR HAVE LET YOURSELF OR YOUR FAMILY DOWN: 1
SUM OF ALL RESPONSES TO PHQ QUESTIONS 1-9: 12
SUM OF ALL RESPONSES TO PHQ9 QUESTIONS 1 & 2: 4
5. POOR APPETITE OR OVEREATING: 1
1. LITTLE INTEREST OR PLEASURE IN DOING THINGS: 3

## 2022-12-08 ASSESSMENT — ENCOUNTER SYMPTOMS
SHORTNESS OF BREATH: 1
RHINORRHEA: 0
ABDOMINAL PAIN: 0
COUGH: 1
WHEEZING: 0
BLOOD IN STOOL: 0
SORE THROAT: 0

## 2022-12-08 NOTE — LETTER
[facility] A department of [facility]  Phone: [phone]  Fax: [phone]        Perrin Kanner, APRN - CNP      December 8, 2022    Patient:   Lico Ortega  Date of Birth   1995  Date of visit   12/8/2022        To Whom it May Concern:      Lico Ortega was seen in my clinic on 12/8/2022. Please excuse from work 12/08/22 and 12/09/22. May return to work on 12/11/2022. If you have any questions or concerns, please don't hesitate to call.       Sincerely,      Perrin Kanner, APRN - CNP/sudha
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
## 2022-12-16 ENCOUNTER — TELEPHONE (OUTPATIENT)
Dept: FAMILY MEDICINE CLINIC | Age: 27
End: 2022-12-16

## 2022-12-16 NOTE — TELEPHONE ENCOUNTER
Patient calling to be put on cancellation list for Dr. Dejesus to be seen as soon as possible. Please call patient with available appointment.

## 2023-01-19 ENCOUNTER — OFFICE VISIT (OUTPATIENT)
Dept: BEHAVIORAL/MENTAL HEALTH | Age: 28
End: 2023-01-19

## 2023-01-19 DIAGNOSIS — F33.9 RECURRENT MAJOR DEPRESSIVE DISORDER, REMISSION STATUS UNSPECIFIED (HCC): ICD-10-CM

## 2023-01-19 DIAGNOSIS — F43.10 PTSD (POST-TRAUMATIC STRESS DISORDER): Primary | ICD-10-CM

## 2023-01-19 PROCEDURE — 90791 PSYCH DIAGNOSTIC EVALUATION: CPT | Performed by: COUNSELOR
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
## 2023-01-19 NOTE — PATIENT INSTRUCTIONS
1) Review the attached information on sleep hygiene. Go through the worksheet at the end of it; are there things that make sense to try right now? If so, do them. 2) Make sure to eat regularly. If you haven't eaten in 3-4 hours, grab at least a small snack, even if you have no appetite. If your stomach is talking to you, listen. 3) Aim for at least 30-40 cumulative minutes of movement each day. This doesn't have to be all at once, or a workout. Just stretch or dance to a song you like. If you've been sitting for more than an hour or two, get up and move around for a few minutes. 4) Review the attached list of coping skills. Try 1-2 a week; do more if you're feeling frisky! 5) Review the attached information on deep breathing. Use this to help manage intense emotions, or just to relax. Practice this daily, even if you're not feeling particularly stressed. 6) Use the attached list of grounding techniques to interrupt uncomfortable memories when they arise. Follow up with something relaxing to extend the feeling of relief. 7) Are there things that you've stopped doing because of stress or your mood? If so, make note of them and consider some ways to reincorporate them into your life. This is also something that can be discussed in counseling. 8) Remember to be kind to yourself. This is a challenging experience, and you're doing the best you can. 9) Resources on intimate partner violence:  MyPlan alex (web-based, or via [address] [employer] / [employer])  http://stopabuse. [facility]/index.html  Www.loveisrespect. org  Local hotline for the [facility]: 0270 138 73 47  10) Check out https://outofthefog. website/ for more information on dealing with self-focused individuals.
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
## 2023-05-10 ENCOUNTER — OFFICE VISIT (OUTPATIENT)
Dept: FAMILY MEDICINE CLINIC | Age: 28
End: 2023-05-10

## 2023-05-10 VITALS
WEIGHT: 199.6 LBS | RESPIRATION RATE: 16 BRPM | DIASTOLIC BLOOD PRESSURE: 68 MMHG | HEIGHT: 63 IN | SYSTOLIC BLOOD PRESSURE: 102 MMHG | BODY MASS INDEX: 35.37 KG/M2

## 2023-05-10 DIAGNOSIS — Z71.3 WEIGHT LOSS COUNSELING, ENCOUNTER FOR: ICD-10-CM

## 2023-05-10 DIAGNOSIS — E66.9 CLASS 2 OBESITY WITHOUT SERIOUS COMORBIDITY WITH BODY MASS INDEX (BMI) OF 35.0 TO 35.9 IN ADULT, UNSPECIFIED OBESITY TYPE: ICD-10-CM

## 2023-05-10 DIAGNOSIS — F41.9 ANXIETY AND DEPRESSION: Primary | ICD-10-CM

## 2023-05-10 DIAGNOSIS — F32.A ANXIETY AND DEPRESSION: Primary | ICD-10-CM

## 2023-05-10 PROCEDURE — 99213 OFFICE O/P EST LOW 20 MIN: CPT | Performed by: NURSE PRACTITIONER

## 2023-05-10 PROCEDURE — 99212 OFFICE O/P EST SF 10 MIN: CPT

## 2023-05-10 RX ORDER — ESCITALOPRAM OXALATE 10 MG/1
10 TABLET ORAL DAILY
Qty: 90 TABLET | Refills: 3 | Status: SHIPPED | OUTPATIENT
Start: 2023-05-10

## 2023-05-10 RX ORDER — PHENTERMINE HYDROCHLORIDE 37.5 MG/1
37.5 CAPSULE ORAL EVERY MORNING
Qty: 30 CAPSULE | Refills: 0 | Status: SHIPPED | OUTPATIENT
Start: 2023-05-10 | End: 2023-06-09

## 2023-05-10 ASSESSMENT — PATIENT HEALTH QUESTIONNAIRE - PHQ9
SUM OF ALL RESPONSES TO PHQ9 QUESTIONS 1 & 2: 2
9. THOUGHTS THAT YOU WOULD BE BETTER OFF DEAD, OR OF HURTING YOURSELF: 0
5. POOR APPETITE OR OVEREATING: 2
7. TROUBLE CONCENTRATING ON THINGS, SUCH AS READING THE NEWSPAPER OR WATCHING TELEVISION: 0
SUM OF ALL RESPONSES TO PHQ QUESTIONS 1-9: 5
4. FEELING TIRED OR HAVING LITTLE ENERGY: 0
SUM OF ALL RESPONSES TO PHQ QUESTIONS 1-9: 5
1. LITTLE INTEREST OR PLEASURE IN DOING THINGS: 1
8. MOVING OR SPEAKING SO SLOWLY THAT OTHER PEOPLE COULD HAVE NOTICED. OR THE OPPOSITE, BEING SO FIGETY OR RESTLESS THAT YOU HAVE BEEN MOVING AROUND A LOT MORE THAN USUAL: 1
10. IF YOU CHECKED OFF ANY PROBLEMS, HOW DIFFICULT HAVE THESE PROBLEMS MADE IT FOR YOU TO DO YOUR WORK, TAKE CARE OF THINGS AT HOME, OR GET ALONG WITH OTHER PEOPLE: 1
6. FEELING BAD ABOUT YOURSELF - OR THAT YOU ARE A FAILURE OR HAVE LET YOURSELF OR YOUR FAMILY DOWN: 0
SUM OF ALL RESPONSES TO PHQ QUESTIONS 1-9: 5
3. TROUBLE FALLING OR STAYING ASLEEP: 0
2. FEELING DOWN, DEPRESSED OR HOPELESS: 1
SUM OF ALL RESPONSES TO PHQ QUESTIONS 1-9: 5

## 2023-05-10 ASSESSMENT — ENCOUNTER SYMPTOMS
SHORTNESS OF BREATH: 0
CONSTIPATION: 0
BLOOD IN STOOL: 0
DIARRHEA: 0
ABDOMINAL PAIN: 0

## 2023-05-10 NOTE — PROGRESS NOTES
Subjective:      Patient ID: Caroline Sarmiento is a 33 y.o. female coming in for   Chief Complaint   Patient presents with    New Patient     New to provider    Anxiety     Medication refills    Weight Management     Would like to discuss medication for this. HPI  Pt presents to office for new pt appt. Her concerns today are needing refills of her lexapro and wanting to discuss weight loss. Has been on lexapro 10mg for awhile, states when she has ran out in the past she definitely notices her anxiety increase. Wants to try something for weight. Weight goal 140-150's. Pt works out at the [employer] 2-3 times per week. She has been meal prepping avoids over eating/binge eating. Has not tried anything in the past for weight loss. Unfortunately, pt does not have insurance currently. Review of Systems   Constitutional:  Negative for fatigue and unexpected weight change. Respiratory:  Negative for shortness of breath. Cardiovascular:  Negative for chest pain and leg swelling. Gastrointestinal:  Negative for abdominal pain, blood in stool, constipation and diarrhea. Skin:  Negative for rash. Neurological:  Negative for dizziness, light-headedness and headaches. Psychiatric/Behavioral:  The patient is nervous/anxious. All other systems reviewed and are negative. Objective:/68   Resp 16   Ht 5' 3\" (1.6 m)   Wt 199 lb 9.6 oz (90.5 kg)   BMI 35.36 kg/m²      Physical Exam  Vitals and nursing note reviewed. Constitutional:       General: She is not in acute distress. Appearance: Normal appearance. She is not ill-appearing. HENT:      Head: Normocephalic. Cardiovascular:      Rate and Rhythm: Normal rate and regular rhythm. Heart sounds: Normal heart sounds. Pulmonary:      Effort: Pulmonary effort is normal.      Breath sounds: Normal breath sounds. Musculoskeletal:      Right lower leg: No edema. Left lower leg: No edema.    Skin:     General: Skin is

## 2023-12-11 ENCOUNTER — OFFICE VISIT (OUTPATIENT)
Dept: PRIMARY CARE CLINIC | Age: 28
End: 2023-12-11
Payer: COMMERCIAL

## 2023-12-11 DIAGNOSIS — E61.1 LOW IRON: Primary | ICD-10-CM

## 2023-12-11 PROCEDURE — 99212 OFFICE O/P EST SF 10 MIN: CPT | Performed by: STUDENT IN AN ORGANIZED HEALTH CARE EDUCATION/TRAINING PROGRAM

## 2023-12-11 PROCEDURE — 99213 OFFICE O/P EST LOW 20 MIN: CPT | Performed by: STUDENT IN AN ORGANIZED HEALTH CARE EDUCATION/TRAINING PROGRAM

## 2023-12-11 RX ORDER — FERROUS SULFATE 325(65) MG
325 TABLET ORAL 2 TIMES DAILY
Qty: 60 TABLET | Refills: 0 | Status: SHIPPED | OUTPATIENT
Start: 2023-12-11

## 2023-12-11 RX ORDER — PNV,CALCIUM 72/IRON/FOLIC ACID 27 MG-1 MG
1 TABLET ORAL EVERY MORNING
COMMUNITY
Start: 2023-09-27

## 2023-12-11 SDOH — ECONOMIC STABILITY: FOOD INSECURITY: WITHIN THE PAST 12 MONTHS, THE FOOD YOU BOUGHT JUST DIDN'T LAST AND YOU DIDN'T HAVE MONEY TO GET MORE.: NEVER TRUE

## 2023-12-11 SDOH — ECONOMIC STABILITY: INCOME INSECURITY: HOW HARD IS IT FOR YOU TO PAY FOR THE VERY BASICS LIKE FOOD, HOUSING, MEDICAL CARE, AND HEATING?: NOT VERY HARD

## 2023-12-11 SDOH — ECONOMIC STABILITY: HOUSING INSECURITY
IN THE LAST 12 MONTHS, WAS THERE A TIME WHEN YOU DID NOT HAVE A STEADY PLACE TO SLEEP OR SLEPT IN A SHELTER (INCLUDING NOW)?: NO

## 2023-12-11 SDOH — ECONOMIC STABILITY: FOOD INSECURITY: WITHIN THE PAST 12 MONTHS, YOU WORRIED THAT YOUR FOOD WOULD RUN OUT BEFORE YOU GOT MONEY TO BUY MORE.: NEVER TRUE

## 2023-12-11 ASSESSMENT — ENCOUNTER SYMPTOMS
NAUSEA: 0
SHORTNESS OF BREATH: 0
DIARRHEA: 0
VOMITING: 0
CONSTIPATION: 0
TROUBLE SWALLOWING: 0
ABDOMINAL PAIN: 0
BLOOD IN STOOL: 0
EYE PAIN: 0
COUGH: 0

## 2023-12-11 NOTE — PROGRESS NOTES
Head: Normocephalic and atraumatic. Right Ear: External ear normal.      Left Ear: External ear normal.      Nose: Nose normal.   Eyes:      General: No scleral icterus. Right eye: No discharge. Left eye: No discharge. Conjunctiva/sclera: Conjunctivae normal.   Cardiovascular:      Rate and Rhythm: Normal rate and regular rhythm. Heart sounds: Normal heart sounds. No murmur heard. Pulmonary:      Effort: Pulmonary effort is normal.      Breath sounds: Normal breath sounds. Musculoskeletal:      Cervical back: Normal range of motion. Skin:     General: Skin is warm and dry. Findings: No erythema or rash. Neurological:      Mental Status: She is alert and oriented to person, place, and time. Cranial Nerves: No cranial nerve deficit. Psychiatric:         Behavior: Behavior normal.         Thought Content:  Thought content normal.         Judgment: Judgment normal.               (Please note that portions of this note were completed with a voice-recognition program. Efforts were made to edit the dictation but occasionally words are mis-transcribed.)

## 2024-01-15 ENCOUNTER — OFFICE VISIT (OUTPATIENT)
Dept: BEHAVIORAL/MENTAL HEALTH | Age: 29
End: 2024-01-15
Payer: MEDICAID

## 2024-01-15 DIAGNOSIS — F33.9 RECURRENT MAJOR DEPRESSIVE DISORDER, REMISSION STATUS UNSPECIFIED (HCC): ICD-10-CM

## 2024-01-15 DIAGNOSIS — F43.10 PTSD (POST-TRAUMATIC STRESS DISORDER): Primary | ICD-10-CM

## 2024-01-15 PROCEDURE — 90791 PSYCH DIAGNOSTIC EVALUATION: CPT | Performed by: COUNSELOR
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
## 2024-05-02 ENCOUNTER — OFFICE VISIT (OUTPATIENT)
Dept: FAMILY MEDICINE CLINIC | Age: 29
End: 2024-05-02
Payer: MEDICAID

## 2024-05-02 VITALS
SYSTOLIC BLOOD PRESSURE: 110 MMHG | OXYGEN SATURATION: 99 % | RESPIRATION RATE: 18 BRPM | HEIGHT: 63 IN | HEART RATE: 83 BPM | DIASTOLIC BLOOD PRESSURE: 66 MMHG | WEIGHT: 192 LBS | BODY MASS INDEX: 34.02 KG/M2

## 2024-05-02 DIAGNOSIS — E66.9 CLASS 1 OBESITY WITHOUT SERIOUS COMORBIDITY WITH BODY MASS INDEX (BMI) OF 34.0 TO 34.9 IN ADULT, UNSPECIFIED OBESITY TYPE: ICD-10-CM

## 2024-05-02 DIAGNOSIS — Z71.3 WEIGHT LOSS COUNSELING, ENCOUNTER FOR: Primary | ICD-10-CM

## 2024-05-02 PROCEDURE — 99214 OFFICE O/P EST MOD 30 MIN: CPT | Performed by: NURSE PRACTITIONER

## 2024-05-02 RX ORDER — LABETALOL 100 MG/1
100 TABLET, FILM COATED ORAL 2 TIMES DAILY
COMMUNITY
Start: 2024-02-23 | End: 2024-05-02

## 2024-05-02 RX ORDER — PHENTERMINE HYDROCHLORIDE 37.5 MG/1
37.5 CAPSULE ORAL EVERY MORNING
Qty: 30 CAPSULE | Refills: 0 | Status: SHIPPED | OUTPATIENT
Start: 2024-05-02 | End: 2024-06-01

## 2024-05-02 ASSESSMENT — PATIENT HEALTH QUESTIONNAIRE - PHQ9
10. IF YOU CHECKED OFF ANY PROBLEMS, HOW DIFFICULT HAVE THESE PROBLEMS MADE IT FOR YOU TO DO YOUR WORK, TAKE CARE OF THINGS AT HOME, OR GET ALONG WITH OTHER PEOPLE: NOT DIFFICULT AT ALL
SUM OF ALL RESPONSES TO PHQ QUESTIONS 1-9: 0
SUM OF ALL RESPONSES TO PHQ QUESTIONS 1-9: 0
1. LITTLE INTEREST OR PLEASURE IN DOING THINGS: NOT AT ALL
3. TROUBLE FALLING OR STAYING ASLEEP: NOT AT ALL
6. FEELING BAD ABOUT YOURSELF - OR THAT YOU ARE A FAILURE OR HAVE LET YOURSELF OR YOUR FAMILY DOWN: NOT AT ALL
5. POOR APPETITE OR OVEREATING: NOT AT ALL
8. MOVING OR SPEAKING SO SLOWLY THAT OTHER PEOPLE COULD HAVE NOTICED. OR THE OPPOSITE, BEING SO FIGETY OR RESTLESS THAT YOU HAVE BEEN MOVING AROUND A LOT MORE THAN USUAL: NOT AT ALL
SUM OF ALL RESPONSES TO PHQ QUESTIONS 1-9: 0
4. FEELING TIRED OR HAVING LITTLE ENERGY: NOT AT ALL
9. THOUGHTS THAT YOU WOULD BE BETTER OFF DEAD, OR OF HURTING YOURSELF: NOT AT ALL
2. FEELING DOWN, DEPRESSED OR HOPELESS: NOT AT ALL
SUM OF ALL RESPONSES TO PHQ QUESTIONS 1-9: 0
7. TROUBLE CONCENTRATING ON THINGS, SUCH AS READING THE NEWSPAPER OR WATCHING TELEVISION: NOT AT ALL
SUM OF ALL RESPONSES TO PHQ9 QUESTIONS 1 & 2: 0

## 2024-05-02 ASSESSMENT — ENCOUNTER SYMPTOMS
ABDOMINAL PAIN: 0
CONSTIPATION: 0
BLOOD IN STOOL: 0
DIARRHEA: 0
SHORTNESS OF BREATH: 0

## 2024-05-02 NOTE — PROGRESS NOTES
counseling, encounter for    2. Class 1 obesity without serious comorbidity with body mass index (BMI) of 34.0 to 34.9 in adult, unspecified obesity type           Plan:    -OARRs reviewed and appropriate  -start phentermine daily, continue with exercise  -f/u in 1 months for med check/weight check    No orders of the defined types were placed in this encounter.     Outpatient Encounter Medications as of 5/2/2024   Medication Sig Dispense Refill    phentermine 37.5 MG capsule Take 1 capsule by mouth every morning for 30 days. Max Daily Amount: 37.5 mg 30 capsule 0    [DISCONTINUED] Ascorbic Acid (VITAMIN C WITH ALVIN HIPS) 500 MG tablet take 1 tablet by mouth twice a day (TAKE WITH IRON SUPPLEMENT TO ENHANCE ABSORPTION) (Patient not taking: Reported on 5/2/2024)      [DISCONTINUED] labetalol (NORMODYNE) 100 MG tablet Take 1 tablet by mouth 2 times daily (Patient not taking: Reported on 5/2/2024)      ferrous sulfate (IRON 325) 325 (65 Fe) MG tablet Take 1 tablet by mouth 2 times daily (Patient not taking: Reported on 5/2/2024) 60 tablet 0    [DISCONTINUED] Prenatal Vit-Fe Fumarate-FA (WESTAB PLUS) 27-1 MG TABS Take 1 tablet by mouth every morning (Patient not taking: Reported on 5/2/2024)      escitalopram (LEXAPRO) 10 MG tablet Take 1 tablet by mouth daily (Patient not taking: Reported on 5/2/2024) 90 tablet 3    [DISCONTINUED] IRON PO Take 1 tablet by mouth daily (Patient not taking: Reported on 12/8/2022)      [DISCONTINUED] Multiple Vitamins-Iron (ONE-TABLET-DAILY/IRON PO) Take 325 mg by mouth 2 times daily (Patient not taking: Reported on 12/8/2022)       No facility-administered encounter medications on file as of 5/2/2024.            Rafi Harmon, APRN - CNP

## 2024-07-08 DIAGNOSIS — E66.9 CLASS 1 OBESITY WITHOUT SERIOUS COMORBIDITY WITH BODY MASS INDEX (BMI) OF 34.0 TO 34.9 IN ADULT, UNSPECIFIED OBESITY TYPE: ICD-10-CM

## 2024-07-08 DIAGNOSIS — Z71.3 WEIGHT LOSS COUNSELING, ENCOUNTER FOR: ICD-10-CM

## 2024-07-08 NOTE — TELEPHONE ENCOUNTER
Patient would like to re start this medication. Patient didn't know if she needed to have appt first or if she could have it sent in.

## 2024-07-10 RX ORDER — PHENTERMINE HYDROCHLORIDE 37.5 MG/1
37.5 CAPSULE ORAL EVERY MORNING
Qty: 30 CAPSULE | Refills: 0 | OUTPATIENT
Start: 2024-07-10 | End: 2024-08-09

## 2024-08-29 ENCOUNTER — OFFICE VISIT (OUTPATIENT)
Dept: FAMILY MEDICINE CLINIC | Age: 29
End: 2024-08-29
Payer: MEDICAID

## 2024-08-29 VITALS
RESPIRATION RATE: 18 BRPM | HEIGHT: 63 IN | HEART RATE: 83 BPM | OXYGEN SATURATION: 99 % | WEIGHT: 187.8 LBS | BODY MASS INDEX: 33.27 KG/M2 | SYSTOLIC BLOOD PRESSURE: 114 MMHG | DIASTOLIC BLOOD PRESSURE: 70 MMHG

## 2024-08-29 DIAGNOSIS — F41.9 ANXIETY AND DEPRESSION: Primary | ICD-10-CM

## 2024-08-29 DIAGNOSIS — F32.A ANXIETY AND DEPRESSION: Primary | ICD-10-CM

## 2024-08-29 PROCEDURE — 99214 OFFICE O/P EST MOD 30 MIN: CPT | Performed by: NURSE PRACTITIONER

## 2024-08-29 RX ORDER — ESCITALOPRAM OXALATE 10 MG/1
10 TABLET ORAL DAILY
Qty: 90 TABLET | Refills: 1 | Status: SHIPPED | OUTPATIENT
Start: 2024-08-29

## 2024-08-29 RX ORDER — SERTRALINE HYDROCHLORIDE 25 MG/1
25 TABLET, FILM COATED ORAL DAILY
COMMUNITY
Start: 2024-06-05 | End: 2024-08-29

## 2024-08-29 ASSESSMENT — PATIENT HEALTH QUESTIONNAIRE - PHQ9
5. POOR APPETITE OR OVEREATING: SEVERAL DAYS
8. MOVING OR SPEAKING SO SLOWLY THAT OTHER PEOPLE COULD HAVE NOTICED. OR THE OPPOSITE, BEING SO FIGETY OR RESTLESS THAT YOU HAVE BEEN MOVING AROUND A LOT MORE THAN USUAL: NOT AT ALL
10. IF YOU CHECKED OFF ANY PROBLEMS, HOW DIFFICULT HAVE THESE PROBLEMS MADE IT FOR YOU TO DO YOUR WORK, TAKE CARE OF THINGS AT HOME, OR GET ALONG WITH OTHER PEOPLE: SOMEWHAT DIFFICULT
SUM OF ALL RESPONSES TO PHQ QUESTIONS 1-9: 8
SUM OF ALL RESPONSES TO PHQ QUESTIONS 1-9: 8
2. FEELING DOWN, DEPRESSED OR HOPELESS: NEARLY EVERY DAY
3. TROUBLE FALLING OR STAYING ASLEEP: NOT AT ALL
7. TROUBLE CONCENTRATING ON THINGS, SUCH AS READING THE NEWSPAPER OR WATCHING TELEVISION: NOT AT ALL
SUM OF ALL RESPONSES TO PHQ QUESTIONS 1-9: 8
SUM OF ALL RESPONSES TO PHQ QUESTIONS 1-9: 8
1. LITTLE INTEREST OR PLEASURE IN DOING THINGS: NOT AT ALL
6. FEELING BAD ABOUT YOURSELF - OR THAT YOU ARE A FAILURE OR HAVE LET YOURSELF OR YOUR FAMILY DOWN: NEARLY EVERY DAY
SUM OF ALL RESPONSES TO PHQ9 QUESTIONS 1 & 2: 3
4. FEELING TIRED OR HAVING LITTLE ENERGY: SEVERAL DAYS
9. THOUGHTS THAT YOU WOULD BE BETTER OFF DEAD, OR OF HURTING YOURSELF: NOT AT ALL
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
